# Patient Record
Sex: FEMALE | Race: BLACK OR AFRICAN AMERICAN | Employment: STUDENT | URBAN - METROPOLITAN AREA
[De-identification: names, ages, dates, MRNs, and addresses within clinical notes are randomized per-mention and may not be internally consistent; named-entity substitution may affect disease eponyms.]

---

## 2017-01-02 ENCOUNTER — RECORDS - HEALTHEAST (OUTPATIENT)
Dept: LAB | Facility: CLINIC | Age: 37
End: 2017-01-02

## 2017-01-03 ENCOUNTER — RECORDS - HEALTHEAST (OUTPATIENT)
Dept: ADMINISTRATIVE | Facility: OTHER | Age: 37
End: 2017-01-03

## 2017-01-03 LAB — HBA1C MFR BLD: 7.4 % (ref 4.2–6.1)

## 2017-01-06 ENCOUNTER — RECORDS - HEALTHEAST (OUTPATIENT)
Dept: LAB | Facility: CLINIC | Age: 37
End: 2017-01-06

## 2017-01-06 LAB
ALT SERPL W P-5'-P-CCNC: 77 U/L (ref 0–45)
AST SERPL W P-5'-P-CCNC: 46 U/L (ref 0–40)

## 2017-01-08 LAB — HBA1C MFR BLD: 7.3 % (ref 4.2–6.1)

## 2017-01-13 ENCOUNTER — RECORDS - HEALTHEAST (OUTPATIENT)
Dept: LAB | Facility: CLINIC | Age: 37
End: 2017-01-13

## 2017-01-13 LAB
ALT SERPL W P-5'-P-CCNC: 29 U/L (ref 0–45)
AST SERPL W P-5'-P-CCNC: 29 U/L (ref 0–40)

## 2018-01-04 ENCOUNTER — OFFICE VISIT (OUTPATIENT)
Dept: OBGYN | Facility: CLINIC | Age: 38
End: 2018-01-04
Payer: COMMERCIAL

## 2018-01-04 VITALS — HEART RATE: 85 BPM | SYSTOLIC BLOOD PRESSURE: 120 MMHG | DIASTOLIC BLOOD PRESSURE: 78 MMHG | OXYGEN SATURATION: 100 %

## 2018-01-04 DIAGNOSIS — B37.31 YEAST INFECTION OF THE VAGINA: ICD-10-CM

## 2018-01-04 DIAGNOSIS — Z12.4 SCREENING FOR MALIGNANT NEOPLASM OF CERVIX: ICD-10-CM

## 2018-01-04 DIAGNOSIS — N92.6 IRREGULAR MENSES: ICD-10-CM

## 2018-01-04 DIAGNOSIS — N89.8 VAGINAL DISCHARGE: ICD-10-CM

## 2018-01-04 DIAGNOSIS — R30.0 DYSURIA: ICD-10-CM

## 2018-01-04 DIAGNOSIS — R10.2 PELVIC PAIN IN FEMALE: Primary | ICD-10-CM

## 2018-01-04 DIAGNOSIS — B37.31 CANDIDAL VULVOVAGINITIS: ICD-10-CM

## 2018-01-04 LAB
ALBUMIN UR-MCNC: NEGATIVE MG/DL
APPEARANCE UR: CLEAR
BETA HCG QUAL IFA URINE: NEGATIVE
BILIRUB UR QL STRIP: NEGATIVE
COLOR UR AUTO: YELLOW
GLUCOSE UR STRIP-MCNC: NEGATIVE MG/DL
HGB UR QL STRIP: NEGATIVE
KETONES UR STRIP-MCNC: NEGATIVE MG/DL
LEUKOCYTE ESTERASE UR QL STRIP: NEGATIVE
NITRATE UR QL: NEGATIVE
PH UR STRIP: 5 PH (ref 5–7)
RBC #/AREA URNS AUTO: NORMAL /HPF
SOURCE: NORMAL
SP GR UR STRIP: 1.02 (ref 1–1.03)
SPECIMEN SOURCE: ABNORMAL
UROBILINOGEN UR STRIP-ACNC: 0.2 EU/DL (ref 0.2–1)
WBC #/AREA URNS AUTO: NORMAL /HPF
WET PREP SPEC: ABNORMAL

## 2018-01-04 PROCEDURE — G0145 SCR C/V CYTO,THINLAYER,RESCR: HCPCS | Performed by: OBSTETRICS & GYNECOLOGY

## 2018-01-04 PROCEDURE — 87591 N.GONORRHOEAE DNA AMP PROB: CPT | Performed by: OBSTETRICS & GYNECOLOGY

## 2018-01-04 PROCEDURE — G0476 HPV COMBO ASSAY CA SCREEN: HCPCS | Performed by: OBSTETRICS & GYNECOLOGY

## 2018-01-04 PROCEDURE — 87210 SMEAR WET MOUNT SALINE/INK: CPT | Performed by: OBSTETRICS & GYNECOLOGY

## 2018-01-04 PROCEDURE — 84703 CHORIONIC GONADOTROPIN ASSAY: CPT | Performed by: OBSTETRICS & GYNECOLOGY

## 2018-01-04 PROCEDURE — 99214 OFFICE O/P EST MOD 30 MIN: CPT | Performed by: OBSTETRICS & GYNECOLOGY

## 2018-01-04 PROCEDURE — 81001 URINALYSIS AUTO W/SCOPE: CPT | Performed by: OBSTETRICS & GYNECOLOGY

## 2018-01-04 PROCEDURE — 87491 CHLMYD TRACH DNA AMP PROBE: CPT | Performed by: OBSTETRICS & GYNECOLOGY

## 2018-01-04 NOTE — PROGRESS NOTES
GYN Clinic Consultation    Date of visit: 2018     Chief Complaint: vaginal pain    HPI:   Jennifer Peterson is a 38 year old  female who I was asked to see in consultation by [facility] for evaluation of vaginal pain. Seems chronic in nature with recent worsening. Been to the emergency room a few times in the past few years for this.     Location: inside the vagina  Quality: burning sensation  Severity: uncomfortable today but sometimes it gets really bad and she has been to the ED for it  Duration: 4 years. Started in , getting worse over the past 3 months.   Timing: always somewhat uncomfortable, intermittent worsening. Not bad today.  Context: Sometimes worse after intercourse. Not cyclic.   Associated signs/symptoms: mucus sticky discharge. No odor. No itching.   Modifying factors: nothing makes it worse or better that she can tell.    Also wants to discuss irregular periods. LMP 12/15/17. In December bleeding was less than normal, black and sticky. In November and October her periods were very heavy with lots of clots. No menses in August or September. Having intercourse with her , no contraception. Open to pregnancy. Menses were regular in the past. States that some one at [facility] is taking care of her diabetes and recently checked her thyroid.       Obstetric History:   Obstetric History       T2      L2     SAB2   TAB0   Ectopic0   Multiple0   Live Births2       # Outcome Date GA Lbr Mahesh/2nd Weight Sex Delivery Anes PTL Lv   4 SAB 04/22/15           3 Term 11    F -SEC   JOSE LUIS   2 Term 10/11/07    F -SEC   JOSE LUIS   1 SAB                 Gynecologic History:  STI history: no  Last Pap: 14  History of abnormal pap: no  History of cervical procedures: no   Contraceptive History: none  The patient is sexually active with male partner.   She has the following concerns about sexual function: none       Past Medical History:  Past  Medical History:   Diagnosis Date     Poliomyelitis osteopathy of left lower leg (H)      Type II diabetes mellitus (H)        Past Surgical History:  Past Surgical History:   Procedure Laterality Date     C/SECTION, LOW TRANSVERSE      , Low Transverse     C/SECTION, LOW TRANSVERSE      , Low Transverse       Medications:  Current Outpatient Prescriptions   Medication     Acetaminophen (TYLENOL PO)     OMEPRAZOLE PO     METFORMIN HCL PO     No current facility-administered medications for this visit.        Allergy:  Allergies   Allergen Reactions     Amoxicillin Swelling     Patient denies food, latex or environmental allergies.     Social History:  Tobacco use: no  Alcohol use: no  Recreational drug use: no  Relationship status is: .    Lives in [facility]     No family history on file.    Review of Systems:  Skin: negative  Eyes: negative  Ears/Nose/Throat: negative  Respiratory: No shortness of breath, dyspnea on exertion, cough, or hemoptysis  Cardiovascular: negative  Gastrointestinal: +h/o constipation, improved. No nausea, no vomiting, no bloating, no early satiety  Genitourinary: +vaginal pain, + discharge, +dysuria, +irreg bleeding  Musculoskeletal: +poliomyelitis of right leg  Neurologic: +migraine headaches, no seizures, no CVA hx  Psychiatric: negative  Hematologic/Lymphatic/Immunologic: negative  Endocrine: + DM2 with poor control, no thyroid problesm     Physical Exam:  Vitals:    18 0859   BP: 120/78   Pulse: 85   SpO2: 100%     There is no height or weight on file to calculate BMI.    Gen: healthy, alert, active, no distress  CV: regular rate and rhythm, normal pulses  Resp: lungs clear to ascultation bilaterally  Abd: soft, non-tender, non-distended, no masses, no hernias, well healed Pfannenstiel scar  Extremities: nontender, no edema  :   - external genitalia: s/p female genital cutting - anterior labia are scarred together. vulva and perineum are normal without  lesion, mass or erythema  - urethra: well supported urethra, no hypermobility, normal Skenes and Bartholins.   - bladder: no tenderness, no masses  - vagina: intact, rugated mucosa without lesions or abnormal discharge. No prolapse.   - cervix: normal, no lesions or abnormal discharge. Pap smear, wet prep, GC/CT cultures are obtained.   - uterus: 10 week size anteverted, no masses or tenderness  - adnexa: no masses or tenderness  - rectal: deferred      Assessment:  Jennifer Peterson is a 38 year old  who presents in consultation for pelvic pain, vaginal discharge and irregular menses.     Plan:  1. Pelvic pain in female  - UA with Microscopic reflex to Culture  - Wet prep  - NEISSERIA GONORRHOEA PCR  - CHLAMYDIA TRACHOMATIS PCR    2. Irregular menses  - Beta HCG qual IFA urine  Discussed pelvic ultrasound and hormonal testing. Patient will monitor menses for next few months and return if they continue to be irregular for further evaluation.    3. Screening for malignant neoplasm of cervix  Cervical Cancer Screening: A pap smear has been collected today, will perform cytology and HPV testing as patient is >30 years old.  If negative cytology and negative high risk HPV, plan to screen with co-testing every 5 years per ASCCP guidelines.  The patient will be notified by phone if there are any abnormal results and follow up arranged in accordance with ASCCP guidelines.    - HPV High Risk Types DNA Cervical  - Pap imaged thin layer screen with HPV - recommended age 30 - 65 years (select HPV order below)    4. Vaginal discharge  - Wet prep  - NEISSERIA GONORRHOEA PCR  - CHLAMYDIA TRACHOMATIS PCR    5. Dysuria  - UA with Microscopic reflex to Culture      6. Candidal vulvovaginitis    Loren Ji MD

## 2018-01-04 NOTE — LETTER
January 11, 2018    Jennifer Peterson  [address] [place] [address]    Dear Jennifer,  We are happy to inform you that your PAP smear result from 01/04/18 is normal.  We are now able to do a follow up test on PAP smears. The DNA test is for HPV (Human Papilloma Virus). Cervical cancer is closely linked with certain types of HPV. Your result showed no evidence of high risk HPV.  Therefore we recommend you return in 5 years for your next pap smear and HPV test.  You will still need to return to the clinic every year for an annual exam and other preventive tests.  Please contact the clinic at [phone] with any questions.  Sincerely,    Loren Ji MD/merly

## 2018-01-04 NOTE — LETTER
[facility]  [address] [place] [address] [address]  [facility] [address]  [phone]      January 9, 2018      Jennifer Peterson  [address] [place] [address]              Dear Jennifer,    Your recent gonorrhea and chlamydia cultures were negative.  If you have any questions please call the nurse line at [phone].      Sincerely,      Loren Ji MD

## 2018-01-04 NOTE — MR AVS SNAPSHOT
"              After Visit Summary   2018    Jennifer Peterson    MRN: [id number]           Patient Information     Date Of Birth          1980        Visit Information        Provider Department      2018 8:15 AM Loren Ji MD; [email or web address]; PHONE,  [facility]        Today's Diagnoses     Pelvic pain in female    -  1    Irregular menses        Candidal vulvovaginitis        Vaginal discharge        Screening for malignant neoplasm of cervix        Dysuria           Follow-ups after your visit        Who to contact     If you have questions or need follow up information about today's clinic visit or your schedule please contact [facility] directly at [phone].  Normal or non-critical lab and imaging results will be communicated to you by MyChart, letter or phone within 4 business days after the clinic has received the results. If you do not hear from us within 7 days, please contact the clinic through MyChart or phone. If you have a critical or abnormal lab result, we will notify you by phone as soon as possible.  Submit refill requests through [employer] or call your pharmacy and they will forward the refill request to us. Please allow 3 business days for your refill to be completed.          Additional Information About Your Visit        MyChart Information     [employer] lets you send messages to your doctor, view your test results, renew your prescriptions, schedule appointments and more. To sign up, go to [email or web address] . Click on \"Log in\" on the left side of the screen, which will take you to the Welcome page. Then click on \"Sign up Now\" on the right side of the page.     You will be asked to enter the access code listed below, as well as some personal information. Please follow the directions to create your username and password.     Your access code is: [email or web address]  Expires: 2018 12:58 PM     Your access code will  " in 90 days. If you need help or a new code, please call your [place] clinic or [phone].        Care EveryWhere ID     This is your Care EveryWhere ID. This could be used by other organizations to access your [place] medical records  [id number]        Your Vitals Were     Pulse Pulse Oximetry                85 100%           Blood Pressure from Last 3 Encounters:   01/04/18 120/78   12/22/15 120/78   10/24/14 128/90    Weight from Last 3 Encounters:   12/22/15 172 lb 14.4 oz (78.4 kg)   10/24/14 168 lb (76.2 kg)   09/10/14 170 lb (77.1 kg)              We Performed the Following     Beta HCG qual IFA urine     CHLAMYDIA TRACHOMATIS PCR     HPV High Risk Types DNA Cervical     NEISSERIA GONORRHOEA PCR     Pap imaged thin layer screen with HPV - recommended age 30 - 65 years (select HPV order below)     UA with Microscopic reflex to Culture     Wet prep        [facility] Care Provider Fax #    [facility] [phone]       [address] [place] [address] [address] [facility] [address]        Equal Access to Services     LYNDON DOBBS : Hadii aad ku hadasho Soomaali, waaxda luqadaha, qaybta kaalmada adeegyada, waxban zafar . So [facility] [phone].    ATENCIÓN: Si habla español, tiene a valladares disposición servicios gratuitos de asistencia lingüística. Llame al [phone].    We comply with applicable federal civil rights laws and [place] laws. We do not discriminate on the basis of race, color, national origin, age, disability, sex, sexual orientation, or gender identity.            Thank you!     Thank you for choosing [facility]  for your care. Our goal is always to provide you with excellent care. Hearing back from our patients is one way we can continue to improve our services. Please take a few minutes to complete the written survey that you may receive in the mail after your visit with us. Thank you!             Your Updated Medication List - Protect others around you:  Learn how to safely use, store and throw away your medicines at www.disposemymeds.org.          This list is accurate as of: 1/4/18 12:58 PM.  Always use your most recent med list.                   Brand Name Dispense Instructions for use Diagnosis    METFORMIN HCL PO      Take 500 mg by mouth 2 times daily (with meals)        OMEPRAZOLE PO           TYLENOL PO      Take 325 mg by mouth every 4 hours as needed for mild pain or fever

## 2018-01-05 RX ORDER — TERCONAZOLE 0.4 %
1 CREAM WITH APPLICATOR VAGINAL AT BEDTIME
Qty: 45 G | Refills: 0 | Status: SHIPPED | OUTPATIENT
Start: 2018-01-05 | End: 2018-01-12

## 2018-01-06 LAB
COPATH REPORT: NORMAL
PAP: NORMAL

## 2018-01-08 LAB
C TRACH DNA SPEC QL NAA+PROBE: NEGATIVE
N GONORRHOEA DNA SPEC QL NAA+PROBE: NEGATIVE
SPECIMEN SOURCE: NORMAL
SPECIMEN SOURCE: NORMAL

## 2018-01-09 LAB
FINAL DIAGNOSIS: NORMAL
HPV HR 12 DNA CVX QL NAA+PROBE: NEGATIVE
HPV16 DNA SPEC QL NAA+PROBE: NEGATIVE
HPV18 DNA SPEC QL NAA+PROBE: NEGATIVE
SPECIMEN DESCRIPTION: NORMAL

## 2018-03-01 ENCOUNTER — OFFICE VISIT (OUTPATIENT)
Dept: MIDWIFE SERVICES | Facility: CLINIC | Age: 38
End: 2018-03-01
Payer: COMMERCIAL

## 2018-03-01 VITALS
BODY MASS INDEX: 31.47 KG/M2 | HEART RATE: 84 BPM | WEIGHT: 171 LBS | DIASTOLIC BLOOD PRESSURE: 78 MMHG | SYSTOLIC BLOOD PRESSURE: 126 MMHG | HEIGHT: 62 IN | TEMPERATURE: 97.3 F

## 2018-03-01 DIAGNOSIS — N91.2 ABSENCE OF MENSTRUATION: Primary | ICD-10-CM

## 2018-03-01 DIAGNOSIS — B91 POLIOMYELITIS OSTEOPATHY OF LEFT LOWER LEG (H): ICD-10-CM

## 2018-03-01 DIAGNOSIS — M89.662 POLIOMYELITIS OSTEOPATHY OF LEFT LOWER LEG (H): ICD-10-CM

## 2018-03-01 LAB — BETA HCG QUAL IFA URINE: NEGATIVE

## 2018-03-01 PROCEDURE — 99213 OFFICE O/P EST LOW 20 MIN: CPT | Performed by: ADVANCED PRACTICE MIDWIFE

## 2018-03-01 PROCEDURE — 36415 COLL VENOUS BLD VENIPUNCTURE: CPT | Performed by: ADVANCED PRACTICE MIDWIFE

## 2018-03-01 PROCEDURE — 84443 ASSAY THYROID STIM HORMONE: CPT | Performed by: ADVANCED PRACTICE MIDWIFE

## 2018-03-01 PROCEDURE — 84703 CHORIONIC GONADOTROPIN ASSAY: CPT | Performed by: ADVANCED PRACTICE MIDWIFE

## 2018-03-01 PROCEDURE — T1013 SIGN LANG/ORAL INTERPRETER: HCPCS | Mod: U3 | Performed by: ADVANCED PRACTICE MIDWIFE

## 2018-03-01 NOTE — PROGRESS NOTES
S; pt here with [place] , was seen early January for yeast infection treated and states does not have any vaginal sypmtoms today.  Here because has not had bleeding since DEcember and having enlarged breasts and breast tenderness.  Pt has diabetes and is stable on oral medications.  Pt does see endocrinologist and states her diabetes is in control.   There have been no changes to medications or dose in last 3 months.    Review Of Systems  Skin: negative  Eyes: negative  Ears/Nose/Throat: negative  Respiratory: No shortness of breath, dyspnea on exertion, cough, or hemoptysis  Cardiovascular: negative  Gastrointestinal: negative  Genitourinary: positive for amenorrhea  Musculoskeletal: negative  Neurologic: negative  Psychiatric: negative  Hematologic/Lymphatic/Immunologic: negative  Endocrine: positive for breast tenderness, amenorrhea,      O: urine pregnancy test:   Negative  A;  Amenorrhea  Diabetic on oral meds  P;   Pt here with [place] , really wanted to see DR. Ji who had seen her previously.  Pt was poor historian, after negative pregnancy test, reviewed at length with patient that will order TSH to assess thyroid but this may just be an irregular cycle. Reviewed perimenopause symptoms and likeliness of irregular cycles for several years in some women.   Discussed that if does not get menses in 2-4 weeks will see Dr. Ji.  Patient agreeable to this.  Total time spent with patient 20 minutes.

## 2018-03-01 NOTE — NURSING NOTE
"Chief Complaint   Patient presents with     Vaginal Problem     in December, was prescribed medication, didn't have period x 2 months. breast tenderness, a little swollen. used to have similar symptoms before period, still getting sympton even though no period.        Initial /78  Pulse 84  Temp 97.3  F (36.3  C) (Oral)  Ht 5' 2\" (1.575 m)  Wt 171 lb (77.6 kg)  BMI 31.28 kg/m2 Estimated body mass index is 31.28 kg/(m^2) as calculated from the following:    Height as of this encounter: 5' 2\" (1.575 m).    Weight as of this encounter: 171 lb (77.6 kg).  BP completed using cuff size: large        The following HM Due: NONE      The following patient reported/Care Every where data was sent to:  P ABSTRACT QUALITY INITIATIVES [09026]        patient has appointment for today    Jessica Aquino CMA                "

## 2018-03-01 NOTE — MR AVS SNAPSHOT
"              After Visit Summary   3/1/2018    Jennifer Peterson    MRN: [id number]           Patient Information     Date Of Birth          1980        Visit Information        Provider Department      3/1/2018 11:00 AM Abhishek Hendricks Michelle R, ALEJO LUO [facility]        Today's Diagnoses     Absence of menstruation    -  1    Poliomyelitis osteopathy of left lower leg (H)           Follow-ups after your visit        Who to contact     If you have questions or need follow up information about today's clinic visit or your schedule please contact [facility] directly at [phone].  Normal or non-critical lab and imaging results will be communicated to you by Reunifyhart, letter or phone within 4 business days after the clinic has received the results. If you do not hear from us within 7 days, please contact the clinic through Reunifyhart or phone. If you have a critical or abnormal lab result, we will notify you by phone as soon as possible.  Submit refill requests through [employer]ForZero or call your pharmacy and they will forward the refill request to us. Please allow 3 business days for your refill to be completed.          Additional Information About Your Visit        MyChart Information     [employer] lets you send messages to your doctor, view your test results, renew your prescriptions, schedule appointments and more. To sign up, go to [email or web address] . Click on \"Log in\" on the left side of the screen, which will take you to the Welcome page. Then click on \"Sign up Now\" on the right side of the page.     You will be asked to enter the access code listed below, as well as some personal information. Please follow the directions to create your username and password.     Your access code is: [email or web address]  Expires: 2018 12:58 PM     Your access code will  in 90 days. If you need help or a new code, please call your [facility] or [phone].        Care EveryWhere " "ID     This is your Care EveryWhere ID. This could be used by other organizations to access your [place] medical records  [id number]        Your Vitals Were     Pulse Temperature Height BMI (Body Mass Index)          84 97.3  F (36.3  C) (Oral) 5' 2\" (1.575 m) 31.28 kg/m2         Blood Pressure from Last 3 Encounters:   03/01/18 126/78   01/04/18 120/78   12/22/15 120/78    Weight from Last 3 Encounters:   03/01/18 171 lb (77.6 kg)   12/22/15 172 lb 14.4 oz (78.4 kg)   10/24/14 168 lb (76.2 kg)              We Performed the Following     Beta HCG Qual, Urine - [facility] and [place] ([id number])     TSH with free T4 reflex        Primary Care Provider Fax #    [facility] [phone]       [address]  [facility] [address]        Equal Access to Services     LYNDON DOBBS : Hadii lindsay ku charleso Socristobal, waaxda luqadaha, qaybta kaalmada adeegyada, alberto zafar . So [facility] [phone].    ATENCIÓN: Si habla español, tiene a valladares disposición servicios gratuitos de asistencia lingüística. Llame al [phone].    We comply with applicable federal civil rights laws and [place] laws. We do not discriminate on the basis of race, color, national origin, age, disability, sex, sexual orientation, or gender identity.            Thank you!     Thank you for choosing [facility]  for your care. Our goal is always to provide you with excellent care. Hearing back from our patients is one way we can continue to improve our services. Please take a few minutes to complete the written survey that you may receive in the mail after your visit with us. Thank you!             Your Updated Medication List - Protect others around you: Learn how to safely use, store and throw away your medicines at www.disposemymeds.org.          This list is accurate as of 3/1/18 12:38 PM.  Always use your most recent med list.                   Brand Name Dispense Instructions for use " Diagnosis    METFORMIN HCL PO      Take 500 mg by mouth 2 times daily (with meals)        OMEPRAZOLE PO           TYLENOL PO      Take 325 mg by mouth every 4 hours as needed for mild pain or fever

## 2018-03-02 LAB — TSH SERPL DL<=0.005 MIU/L-ACNC: 1.24 MU/L (ref 0.4–4)

## 2021-06-11 ENCOUNTER — MEDICAL CORRESPONDENCE (OUTPATIENT)
Dept: HEALTH INFORMATION MANAGEMENT | Facility: CLINIC | Age: 41
End: 2021-06-11

## 2021-06-22 ENCOUNTER — TRANSCRIBE ORDERS (OUTPATIENT)
Dept: OTHER | Age: 41
End: 2021-06-22

## 2021-06-22 DIAGNOSIS — E11.9 TYPE 2 DIABETES MELLITUS (H): Primary | ICD-10-CM

## 2021-06-23 ENCOUNTER — APPOINTMENT (OUTPATIENT)
Dept: INTERPRETER SERVICES | Facility: CLINIC | Age: 41
End: 2021-06-23
Payer: COMMERCIAL

## 2021-06-29 ENCOUNTER — APPOINTMENT (OUTPATIENT)
Dept: INTERPRETER SERVICES | Facility: CLINIC | Age: 41
End: 2021-06-29
Payer: COMMERCIAL

## 2024-11-21 ENCOUNTER — TRANSCRIBE ORDERS (OUTPATIENT)
Dept: OTHER | Age: 44
End: 2024-11-21

## 2024-11-21 DIAGNOSIS — G40.109 LOCALIZATION-RELATED EPILEPSY (H): Primary | ICD-10-CM

## 2025-01-23 ENCOUNTER — OFFICE VISIT (OUTPATIENT)
Dept: NEUROLOGY | Facility: CLINIC | Age: 45
End: 2025-01-23
Attending: FAMILY MEDICINE
Payer: COMMERCIAL

## 2025-01-23 VITALS
SYSTOLIC BLOOD PRESSURE: 120 MMHG | DIASTOLIC BLOOD PRESSURE: 83 MMHG | TEMPERATURE: 96.6 F | RESPIRATION RATE: 18 BRPM | OXYGEN SATURATION: 99 % | HEART RATE: 84 BPM

## 2025-01-23 DIAGNOSIS — G40.109 LOCALIZATION-RELATED EPILEPSY (H): ICD-10-CM

## 2025-01-23 RX ORDER — LEVETIRACETAM 1000 MG/1
1000 TABLET ORAL 2 TIMES DAILY
Qty: 120 TABLET | Refills: 11 | Status: SHIPPED | OUTPATIENT
Start: 2025-01-23

## 2025-01-23 NOTE — PROGRESS NOTES
2025    CHIEF COMPLAINT:  Seizures.    HISTORY OF PRESENT ILLNESS:  This patient is a 45 year old years old right handed female with PMHx of T2DM, latent TB, viral meningitis (2017), polio osteopathy of lower leg (uses crutches to ambulate at baseline) presented with history of seizures.  Patient is accompanied by her friend in the clinic today.    Patient started to have seizures before  when she was in [place]. Initially the seizures were infrequent, she was having seizures once or twice a year. [place] in . Then her seizures got worse over time. She started on Keppra about 12 years ago. She continues to have seizures. Now she probably has one seizure every 1-2 month.    Patient has only one type of seizure. They are described as starting with yawning, fatigues, and going to sleep, unresponsive, foaming in the mouth, her arms and body will become stiff, then convulsing. The seizures will last for a few minutes, followed by postictal confusion and fatigue. Her seizure are associated with tongue biting, no incontinence.     TRIGGERS FOR SEIZURES:    Fatigue  Trauma  Stress  PTSD    RISK FACTORS FOR SEIZURES:  Positive for history of meningitis. No history of head trauma with loss of consciousness, no history of febrile convulsions.  No history of brain tumor or stroke. Normal development.     ALLERGIES:  Amoxicillin    CURRENT MEDICATIONS:  Current Outpatient Medications   Medication Sig Dispense Refill    Acetaminophen (TYLENOL PO) Take 325 mg by mouth every 4 hours as needed for mild pain or fever      METFORMIN HCL PO Take 500 mg by mouth 2 times daily (with meals)      OMEPRAZOLE PO          PAST AEDs:  None    PAST MEDICAL HISTORY:  Past Medical History:   Diagnosis Date    Poliomyelitis osteopathy of left lower leg (H)     Type II diabetes mellitus (H)        PAST SURGICAL HISTORY:  Past Surgical History:   Procedure Laterality Date    C/SECTION, LOW TRANSVERSE      , Low  Transverse    C/SECTION, LOW TRANSVERSE      , Low Transverse       Family History:  History of seizures:   None    SOCIAL HISTORY:  Born and raised: [place]   Work: Not working  Tobacco: No, ETOH: No, Drugs: No    Family: , Children: 2 children    REVIEW OF SYSTEMS:   Negative    PHYSICAL EXAMINATIONS:       Right leg atrophy, hemiplegic gait, using a cane.    PREVIOUS DIAGNOSTIC TESTING:    MRI brain:   Impression:   1. Multiple scattered T2 hyperintense foci throughout the cerebral hemispheric white matter, slightly greater than expected for patient's age and likely sequela of chronic microvascular ischemic changes.   2. Normal brain MRA without contrast.   3. Normal neck MRA without and with contrast.     EEG:   None       IMPRESSION:  This patient is a 45 year old years old right handed female with history of multiple medical problems presented with seizures before . She started on Keppra about 12 years ago. She continues to have intermittent seizures, about once every 1-2 months.     She only has one type of seizures, likely focal impaired to bilateral tonic clinic seizures. She is currently on Keppra 2000 mg bid.    We would like to have VEEG for further evaluation.      PLAN:    1. Continue Keppra 2000 mg bid   2. Labs: Keppra  3. Outpatient EEG  4. RTC in 3 months. May consider adding another AED, options are Vimpat, Trileptal, Lamictal, etc.      Zeina Ken MD      The longitudinal plan of care for seizures was addressed during this visit. Due to the added complexity in care, I will continue to support this patient in the subsequent management of this condition and with the ongoing continuity of care of this condition.       68 min total time was spent on the day of this visit.      50 min was spent on face to face time  18 min was spent on preparation of visit to review charts and labs, ordering medications and tests, and documentation of clinical information

## 2025-03-04 ENCOUNTER — TELEPHONE (OUTPATIENT)
Dept: NEUROLOGY | Facility: CLINIC | Age: 45
End: 2025-03-04

## 2025-08-20 ENCOUNTER — OFFICE VISIT (OUTPATIENT)
Dept: NEUROLOGY | Facility: CLINIC | Age: 45
End: 2025-08-20
Payer: COMMERCIAL

## 2025-08-20 VITALS
HEART RATE: 84 BPM | DIASTOLIC BLOOD PRESSURE: 83 MMHG | BODY MASS INDEX: 31.09 KG/M2 | SYSTOLIC BLOOD PRESSURE: 120 MMHG | OXYGEN SATURATION: 98 % | TEMPERATURE: 96.8 F | HEIGHT: 62 IN

## 2025-08-20 DIAGNOSIS — G40.109 LOCALIZATION-RELATED EPILEPSY (H): ICD-10-CM

## 2025-08-20 DIAGNOSIS — G40.909 SEIZURE DISORDER (H): ICD-10-CM

## 2025-08-20 RX ORDER — LEVETIRACETAM 1000 MG/1
1000 TABLET ORAL 2 TIMES DAILY
Qty: 120 TABLET | Refills: 11 | Status: SHIPPED | OUTPATIENT
Start: 2025-08-20

## 2025-08-20 RX ORDER — LACOSAMIDE 50 MG/1
100 TABLET ORAL 2 TIMES DAILY
Qty: 120 TABLET | Refills: 5 | Status: SHIPPED | OUTPATIENT
Start: 2025-08-20